# Patient Record
Sex: MALE | Race: WHITE | NOT HISPANIC OR LATINO | Employment: FULL TIME | URBAN - NONMETROPOLITAN AREA
[De-identification: names, ages, dates, MRNs, and addresses within clinical notes are randomized per-mention and may not be internally consistent; named-entity substitution may affect disease eponyms.]

---

## 2020-06-04 PROBLEM — R07.9 CHEST PAIN: Status: ACTIVE | Noted: 2020-06-04

## 2023-07-03 ENCOUNTER — TELEPHONE (OUTPATIENT)
Dept: FAMILY MEDICINE CLINIC | Facility: CLINIC | Age: 53
End: 2023-07-03

## 2023-07-03 NOTE — TELEPHONE ENCOUNTER
Caller: Afshin Reyes    Relationship to patient: Self    Best call back number: [phone]    Date of positive COVID19 test: 7/1/23    COVID19 symptoms: DISORIENTED, CONGESTION, BODY ACHES, FEVER, FATIGUE     Date of initial quarantine: 6/30/23    Additional information or concerns: PATIENT'S SPOUSE WAS DIAGNOSED WITH COVID AND PATIENT IS ASKING FOR ADVISEMENT.  PATIENT ASKED IF HE CAN GET MEDICATION     What is the patients preferred pharmacy: [facility]